# Patient Record
Sex: MALE | Race: WHITE | Employment: FULL TIME | URBAN - NONMETROPOLITAN AREA
[De-identification: names, ages, dates, MRNs, and addresses within clinical notes are randomized per-mention and may not be internally consistent; named-entity substitution may affect disease eponyms.]

---

## 2021-07-26 ENCOUNTER — HOSPITAL ENCOUNTER (EMERGENCY)
Facility: HOSPITAL | Age: 61
Discharge: HOME | End: 2021-07-26
Attending: PHYSICIAN ASSISTANT | Admitting: PHYSICIAN ASSISTANT
Payer: COMMERCIAL

## 2021-07-26 VITALS
TEMPERATURE: 97 F | DIASTOLIC BLOOD PRESSURE: 73 MMHG | RESPIRATION RATE: 16 BRPM | HEART RATE: 68 BPM | OXYGEN SATURATION: 94 % | SYSTOLIC BLOOD PRESSURE: 118 MMHG
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
## 2021-07-26 PROCEDURE — 99213 OFFICE O/P EST LOW 20 MIN: CPT | Performed by: PHYSICIAN ASSISTANT

## 2021-07-26 PROCEDURE — G0463 HOSPITAL OUTPT CLINIC VISIT: HCPCS

## 2021-07-26 RX ORDER — DOXYCYCLINE 100 MG/1
100 CAPSULE ORAL 2 TIMES DAILY
Qty: 28 CAPSULE | Refills: 0 | Status: SHIPPED | OUTPATIENT
Start: 2021-07-26 | End: 2021-08-09

## 2021-07-26 ASSESSMENT — ENCOUNTER SYMPTOMS
SORE THROAT: 0
CHEST TIGHTNESS: 0
ARTHRALGIAS: 0
MYALGIAS: 0
CHILLS: 0
FEVER: 0
NUMBNESS: 0
DIARRHEA: 0
CONFUSION: 0
ABDOMINAL PAIN: 0
NAUSEA: 0
FACIAL ASYMMETRY: 0
SHORTNESS OF BREATH: 0
FATIGUE: 0
LIGHT-HEADEDNESS: 0
DIFFICULTY URINATING: 0
DIZZINESS: 0
HEADACHES: 0
NECK STIFFNESS: 0
VOMITING: 0

## 2021-07-26 NOTE — ED PROVIDER NOTES
"  History     Chief Complaint   Patient presents with     Derm Problem     \"bulls eye\" on R upper arm     HPI  Jagdish Covington is a 61 year old male who presents with concerns of possible tick bites.    Symptoms: Rash red, > 5 cm, developed 24-48 hours after bite  Location: right upper arm    Type of Tick: unsure  Length of attachment: unsure  Rash: yes  Fever, Chills: none  Muscle Aches: none  Stiffness of Neck: none  Lymphadenopathy Present: No   Facial Nerve Palsy Present: none  Treatments Tried: none  Prior Tick Bites: yes, no history of lyme disease    C: NEGATIVE for fever, chills, change in weight  I: NEGATIVE for worrisome rashes, moles or lesions  HEME/ALLERGY/IMMUNE:  No symptoms of anaphylaxis or significant allergic reaction    PCP: Outside provider    Allergies:  No Known Allergies    Problem List:    There are no problems to display for this patient.       Past Medical History:    No past medical history on file.    Past Surgical History:    No past surgical history on file.    Family History:    No family history on file.    Social History:  Marital Status:   [2]  Social History     Tobacco Use     Smoking status: Not on file   Substance Use Topics     Alcohol use: Not on file     Drug use: Not on file      Medications:    doxycycline hyclate (VIBRAMYCIN) 100 MG capsule      Review of Systems   Constitutional: Negative for chills, fatigue and fever.   HENT: Negative for sore throat.    Respiratory: Negative for chest tightness and shortness of breath.    Gastrointestinal: Negative for abdominal pain, diarrhea, nausea and vomiting.   Genitourinary: Negative for difficulty urinating.   Musculoskeletal: Negative for arthralgias, myalgias and neck stiffness.   Skin: Positive for rash. Negative for pallor.   Neurological: Negative for dizziness, facial asymmetry, light-headedness, numbness and headaches.   Psychiatric/Behavioral: Negative for confusion.   All other systems reviewed and are " negative.    Physical Exam   BP: 118/73  Pulse: 68  Temp: 97  F (36.1  C)  Resp: 16  SpO2: 94 %    Physical Exam  Vitals and nursing note reviewed.   HENT:      Head: Normocephalic and atraumatic.   Eyes:      Conjunctiva/sclera: Conjunctivae normal.   Cardiovascular:      Rate and Rhythm: Normal rate and regular rhythm.      Heart sounds: Normal heart sounds.   Pulmonary:      Effort: Pulmonary effort is normal.      Breath sounds: Normal breath sounds.   Musculoskeletal:         General: Normal range of motion.      Cervical back: Normal range of motion.   Skin:     General: Skin is warm and dry.      Capillary Refill: Capillary refill takes less than 2 seconds.      Findings: Rash present.      Comments: Well circumscribed, erythema migrans to lateral upper right upper which measures 4 inches long x 3 inches wide x superficial (flat) depth. No fluctuance, drainage or calor noted.    Neurological:      General: No focal deficit present.      Mental Status: He is alert and oriented to person, place, and time.   Psychiatric:         Mood and Affect: Mood normal.         Behavior: Behavior normal.         Thought Content: Thought content normal.         Judgment: Judgment normal.       ED Course        Procedures                No results found for this or any previous visit (from the past 24 hour(s)).    Medications - No data to display    Assessments & Plan (with Medical Decision Making)     I have reviewed the nursing notes.    I have reviewed the findings, diagnosis, plan and need for follow up with the patient.    Discharge Medication List as of 7/26/2021  6:58 PM      START taking these medications    Details   doxycycline hyclate (VIBRAMYCIN) 100 MG capsule Take 1 capsule (100 mg) by mouth 2 times daily for 14 days, Disp-28 capsule, R-0, E-Prescribe             Final diagnoses:   Tick bite, initial encounter   Vital signs stable. PE notable for tick bite, entirety of HPI/PE available for review below.   Prescription of Doxycycline PO BID x 14 d duration. Use caution in sunlight as can lead to increased risk of sunburn while on ABX.  Avoid taking medication with daily multivitamin. Lab testing: declined at this time, he will return if symptoms worsen. Discussed to monitor for fevers, chills/flu like symptoms, stiffness of neck, swollen lymph nodes (lymphadenopathy), neurologic or cardiac changes, worsening joint/muscle aches, etc., if these symptoms occur, patient is agreeable to return for reevaluation. Patient is in agreement and understanding of the above treatment plan. All questions and concerns were addressed and answered to patient's satisfaction. AVS reviewed with patient.    Discussed lab work can have false negative if performed prior to 3-4 weeks. Discussed that lab work can test for acute infection (IgM) versus longer term (IgG detection). Discussed that an IgG reaction is similar to that that vaccines/immunizations use    Marlena Reilly PA-C  7/26/2021   [facility]

## 2021-07-26 NOTE — ED TRIAGE NOTES
R upper arm itching for the past 5 days. Noted redness today on R upper arm. Denies fever, chills, muscle aches, dizziness, fatigue. No tick bites that he is aware of but thinks he may have lymes.

## 2021-07-26 NOTE — DISCHARGE INSTRUCTIONS
Please refer to your AVS for follow up and pain/symptoms management recommendations (I.e.: medications, helpful conservative treatment modalities, appropriate follow up if need to a specialist or family practice, etc.). Please return to either your primary care provider rapid/urgent care clinic if your symptoms change or worsen.     Discharge instructions:  -If you were prescribed a medication(s), please take this as prescribed/directed  -Monitor your symptoms, if changing/worsening, return to UC/ER or PCP for follow up    Tick Bite   -For some, labs are also drawn to check for Lyme disease - if you had labs drawn, please note that these labs can take 3-7 days to return, either your PCP or a member of the UC team should reach out to you with your results, if you do not receive these, please reach out to your PCP.   -Please take the course of antibiotics until completion (if prescribed).    -For prevention of further bites, we recommend long shirts/pants while outdoors, tick repellant with > 20% DEET, take a shower or bathe within 2 hours of being outdoors, check yourself for ticks (look in unusual locations such as hair, behind ears/knees, etc.).   -For pain control (if needed), if you are able to take Ibuprofen and Tylenol, we recommend alternating these (see note below). Do not wear a patch over your eye (unless directed to do so).    -Alternate every 4 hours as needed. I.e.: Ibuprofen at 8am, Tylenol 12pm, Ibuprofen 4pm    -Daily maximum of Tylenol is 4000mg (recommend staying under 3000mg)   -Daily maximum of Ibuprofen is 1200mg (take no more than six 200mg pills a day)    Recommend to hold daily multivitamin  Doxycycline as this can make the medication less effective.  We also recommend that you watch your sunlight exposure as you will burn quicker than usual.    Additional Information:  Any remaining embedded matter can be left and will clear naturally.   Monitor the area where ticks were removed as they can  become infected also. May use antibacterial ointment.    -Monitor for the below symptoms for 30 days and call your healthcare provider if you get any of the following:   ? Fatigue    ? Headache    ? Neck stiffness    ? Myalgias/muscle soreness   ? Arthralgias/joint pain   ? Regional lymphadenopathy/swollen lymph nodes   ? Fever    * Keep vigilant with tick checks.    Risk of Lyme disease is very low with the tick has been attached for fewer than 36 hours.    Common locations to check for ticks in the future include entering around her ears, and and around the hair, inside the bellybutton, under your arms, around her waist, between your legs and the back sides of your knees.    Approach to prophylaxis -- per up to date guidelines, they agree with the Infectious Diseases Society of [place] (IDSA) guidelines that recommend antibiotic prophylaxis only in patients who meet all of the following criteria:  ?Attached tick identified as an adult or nymphal I. scapularis tick (deer tick).  ?Tick is estimated to have been attached for ?36 hours (by degree of engorgement or time of exposure).  ?Prophylaxis is begun within 72 hours of tick removal.  ?Local rate of infection of ticks with B. burgdorferi is ?20 percent (these rates of infection have been shown to occur in parts of [place], parts of the [place], and parts of [place] and [place]).    You were prescribed an antibiotic, please take into consideration the following information:  - Take entire course of antibiotic even if you start to feel better.  - Antibiotics can cause stomach upset including nausea and diarrhea. Read your bottle or ask the pharmacist if antibiotic can be taken with food to help prevent nausea. If you have symptoms of diarrhea you can take an over-the-counter probiotic and/or increase foods with probiotics such as yogurt, [place], sauerkraut.  -Use caution in sunlight as can lead to increased risk of sunburn while on ABX  (antibiotics).     -Watch sunlight exposure - can lead to faster sunburn